# Patient Record
Sex: FEMALE | Race: WHITE | NOT HISPANIC OR LATINO | Employment: STUDENT | URBAN - METROPOLITAN AREA
[De-identification: names, ages, dates, MRNs, and addresses within clinical notes are randomized per-mention and may not be internally consistent; named-entity substitution may affect disease eponyms.]

---
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
## 2020-08-06 ENCOUNTER — RX ONLY (RX ONLY)
Age: 20
End: 2020-08-06

## 2020-08-06 RX ORDER — SULFACETAMIDE SODIUM 100 MG/ML
ADD'L SIG APPLICATION LOTION TOPICAL ADD'L SIG
Qty: 118 | Refills: 1
Start: 2020-08-06

## 2020-08-12 ENCOUNTER — VBEAM/KTP-BH (OUTPATIENT)
Dept: URBAN - METROPOLITAN AREA CLINIC 31 | Facility: CLINIC | Age: 20
Setting detail: DERMATOLOGY
End: 2020-08-12

## 2020-08-12 DIAGNOSIS — L02.91 CUTANEOUS ABSCESS, UNSPECIFIED: ICD-10-CM

## 2020-08-12 PROCEDURE — 99213 OFFICE O/P EST LOW 20 MIN: CPT

## 2020-08-13 ENCOUNTER — RX ONLY (RX ONLY)
Age: 20
End: 2020-08-13

## 2020-08-13 RX ORDER — DOXYCYCLINE 100 MG/1
ADD'L SIG TABLET TABLET, FILM COATED ORAL ADD'L SIG
Qty: 30 | Refills: 1
Start: 2020-08-13

## 2021-02-16 ENCOUNTER — *BOTOX/DYSPORT (OUTPATIENT)
Dept: URBAN - METROPOLITAN AREA CLINIC 31 | Facility: CLINIC | Age: 21
Setting detail: DERMATOLOGY
End: 2021-02-16

## 2021-02-16 PROCEDURE — OTHER BOTOX VIAL # (100U): OTHER

## 2021-02-16 PROCEDURE — OTHER BOTOX COSMETIC - 1 AREA *BD*: OTHER

## 2021-03-02 ENCOUNTER — RX ONLY (RX ONLY)
Age: 21
End: 2021-03-02

## 2021-03-02 ENCOUNTER — *BOTOX/DYSPORT TOUCH-UP (OUTPATIENT)
Dept: URBAN - METROPOLITAN AREA CLINIC 31 | Facility: CLINIC | Age: 21
Setting detail: DERMATOLOGY
End: 2021-03-02

## 2021-03-02 DIAGNOSIS — L70.0 ACNE VULGARIS: ICD-10-CM

## 2021-03-02 PROCEDURE — OTHER NO CHARGE OFFICE VISIT: OTHER

## 2021-03-02 RX ORDER — VALACYCLOVIR HYDROCHLORIDE 500 MG/1
1 TABLET TABLET, FILM COATED ORAL BID
Qty: 180 | Refills: 3
Start: 2021-03-02

## 2021-03-23 ENCOUNTER — RX ONLY (RX ONLY)
Age: 21
End: 2021-03-23

## 2021-03-23 ENCOUNTER — WART (OUTPATIENT)
Dept: URBAN - METROPOLITAN AREA CLINIC 30 | Facility: CLINIC | Age: 21
Setting detail: DERMATOLOGY
End: 2021-03-23

## 2021-03-23 DIAGNOSIS — L72.3 SEBACEOUS CYST: ICD-10-CM

## 2021-03-23 PROBLEM — L71.0 PERIORAL DERMATITIS: Status: ACTIVE | Noted: 2021-03-23

## 2021-03-23 PROBLEM — L71.0 PERIORAL DERMATITIS: Status: RESOLVED | Noted: 2021-03-23

## 2021-03-23 PROCEDURE — 17110 DESTRUCT B9 LESION 1-14: CPT

## 2021-03-23 PROCEDURE — 99214 OFFICE O/P EST MOD 30 MIN: CPT

## 2021-03-24 PROBLEM — L82.1 OTHER SEBORRHEIC KERATOSIS: Status: RESOLVED | Noted: 2021-03-24

## 2021-03-24 PROBLEM — L82.1 OTHER SEBORRHEIC KERATOSIS: Status: ACTIVE | Noted: 2021-03-24

## 2021-08-30 ENCOUNTER — *BOTOX/DYSPORT (OUTPATIENT)
Dept: URBAN - METROPOLITAN AREA CLINIC 31 | Facility: CLINIC | Age: 21
Setting detail: DERMATOLOGY
End: 2021-08-30

## 2021-08-30 DIAGNOSIS — L30.9 DERMATITIS, UNSPECIFIED: ICD-10-CM

## 2021-08-30 DIAGNOSIS — L40.0 PSORIASIS VULGARIS: ICD-10-CM

## 2021-08-30 DIAGNOSIS — R21 RASH AND OTHER NONSPECIFIC SKIN ERUPTION: ICD-10-CM

## 2021-08-30 PROCEDURE — OTHER KYBELLA*BD*: OTHER

## 2021-08-30 PROCEDURE — OTHER BOTOX VIAL # (100U): OTHER

## 2021-08-30 PROCEDURE — OTHER BOTOX COSMETIC - TOUCH-UP: OTHER

## 2022-01-03 ENCOUNTER — *BOTOX/DYSPORT (OUTPATIENT)
Dept: URBAN - METROPOLITAN AREA CLINIC 31 | Facility: CLINIC | Age: 22
Setting detail: DERMATOLOGY
End: 2022-01-03

## 2022-01-03 DIAGNOSIS — L72.3 SEBACEOUS CYST: ICD-10-CM

## 2022-01-03 PROCEDURE — OTHER RESTYLANE SILK 1.0ML-L: OTHER

## 2022-01-03 PROCEDURE — OTHER KYBELLA*BD*: OTHER

## 2022-04-25 ENCOUNTER — *FILLER (OUTPATIENT)
Dept: URBAN - METROPOLITAN AREA CLINIC 31 | Facility: CLINIC | Age: 22
Setting detail: DERMATOLOGY
End: 2022-04-25

## 2022-04-25 ENCOUNTER — RX ONLY (RX ONLY)
Age: 22
End: 2022-04-25

## 2022-04-25 DIAGNOSIS — L70.0 ACNE VULGARIS: ICD-10-CM

## 2022-04-25 PROCEDURE — OTHER KYBELLA*BD*: OTHER

## 2022-04-25 PROCEDURE — OTHER VOLUMA-: OTHER

## 2022-04-25 PROCEDURE — OTHER BOTOX VIAL # (100U): OTHER

## 2022-04-25 PROCEDURE — OTHER BOTOX COSMETIC - 1 AREA *BD*: OTHER

## 2022-04-25 PROCEDURE — 99213 OFFICE O/P EST LOW 20 MIN: CPT

## 2022-04-25 RX ORDER — DOXYCYCLINE HYCLATE 100 MG/1
1 CAPSULE CAPSULE, GELATIN COATED ORAL TWICE A DAY
Qty: 60 | Refills: 2
Start: 2022-04-25

## 2022-09-07 ENCOUNTER — APPOINTMENT (OUTPATIENT)
Dept: URBAN - METROPOLITAN AREA CLINIC 208 | Age: 22
Setting detail: DERMATOLOGY
End: 2022-09-10

## 2022-09-07 DIAGNOSIS — Z41.9 ENCOUNTER FOR PROCEDURE FOR PURPOSES OTHER THAN REMEDYING HEALTH STATE, UNSPECIFIED: ICD-10-CM

## 2022-09-07 PROCEDURE — OTHER OTHER: OTHER

## 2022-09-07 PROCEDURE — OTHER COSMETIC FOLLOW-UP: OTHER

## 2022-09-07 ASSESSMENT — LOCATION DETAILED DESCRIPTION DERM
LOCATION DETAILED: RIGHT CENTRAL MALAR CHEEK
LOCATION DETAILED: LEFT CENTRAL MALAR CHEEK

## 2022-09-07 ASSESSMENT — LOCATION ZONE DERM: LOCATION ZONE: FACE

## 2022-09-07 ASSESSMENT — LOCATION SIMPLE DESCRIPTION DERM
LOCATION SIMPLE: RIGHT CHEEK
LOCATION SIMPLE: LEFT CHEEK

## 2022-09-07 NOTE — HPI: COSMETIC FOLLOW UP
What Condition Are We Treating?: Patient wants to dissolve filler\\nHad volume here and additional more at another place but wants it all dissolved
What Procedure Did We Perform At The Last Visit?: Volume, Botox and Kybella

## 2022-09-07 NOTE — PROCEDURE: COSMETIC FOLLOW-UP
Treatment Override (Free Text): vitrase
Price (Use Numbers Only, No Special Characters Or $): 0
Detail Level: Zone

## 2022-09-07 NOTE — PROCEDURE: OTHER
Render Risk Assessment In Note?: no
Note Text (......Xxx Chief Complaint.): This diagnosis correlates with the
Other (Free Text): Dissolve filler with vitrase
Detail Level: Zone